# Patient Record
Sex: FEMALE
[De-identification: names, ages, dates, MRNs, and addresses within clinical notes are randomized per-mention and may not be internally consistent; named-entity substitution may affect disease eponyms.]

---

## 2019-01-16 ENCOUNTER — HOSPITAL ENCOUNTER (EMERGENCY)
Dept: HOSPITAL 14 - H.ER | Age: 34
Discharge: HOME | End: 2019-01-16
Payer: SELF-PAY

## 2019-01-16 VITALS
DIASTOLIC BLOOD PRESSURE: 58 MMHG | HEART RATE: 98 BPM | RESPIRATION RATE: 19 BRPM | SYSTOLIC BLOOD PRESSURE: 103 MMHG | TEMPERATURE: 98.5 F

## 2019-01-16 VITALS — OXYGEN SATURATION: 98 %

## 2019-01-16 DIAGNOSIS — J10.1: Primary | ICD-10-CM

## 2019-01-16 LAB
ALBUMIN SERPL-MCNC: 4.1 G/DL (ref 3.5–5)
ALBUMIN/GLOB SERPL: 1.3 {RATIO} (ref 1–2.1)
ALT SERPL-CCNC: 103 U/L (ref 9–52)
AST SERPL-CCNC: 88 U/L (ref 14–36)
BASE EXCESS BLDV CALC-SCNC: 1 MMOL/L (ref 0–2)
BASOPHILS # BLD AUTO: 0 K/UL (ref 0–0.2)
BASOPHILS NFR BLD: 0.3 % (ref 0–2)
BUN SERPL-MCNC: 4 MG/DL (ref 7–17)
CALCIUM SERPL-MCNC: 9.6 MG/DL (ref 8.4–10.2)
EOSINOPHIL # BLD AUTO: 0 K/UL (ref 0–0.7)
EOSINOPHIL NFR BLD: 0.3 % (ref 0–4)
ERYTHROCYTE [DISTWIDTH] IN BLOOD BY AUTOMATED COUNT: 13.4 % (ref 11.5–14.5)
GFR NON-AFRICAN AMERICAN: > 60
HGB BLD-MCNC: 12.5 G/DL (ref 12–16)
LYMPHOCYTES # BLD AUTO: 1 K/UL (ref 1–4.3)
LYMPHOCYTES NFR BLD AUTO: 16.1 % (ref 20–40)
MCH RBC QN AUTO: 30.7 PG (ref 27–31)
MCHC RBC AUTO-ENTMCNC: 34.1 G/DL (ref 33–37)
MCV RBC AUTO: 89.8 FL (ref 81–99)
MONOCYTES # BLD: 0.7 K/UL (ref 0–0.8)
MONOCYTES NFR BLD: 10.7 % (ref 0–10)
NEUTROPHILS # BLD: 4.7 K/UL (ref 1.8–7)
NEUTROPHILS NFR BLD AUTO: 72.6 % (ref 50–75)
NRBC BLD AUTO-RTO: 0.1 % (ref 0–0)
PCO2 BLDV: 42 MMHG (ref 40–60)
PH BLDV: 7.4 [PH] (ref 7.32–7.43)
PLATELET # BLD: 123 K/UL (ref 130–400)
PMV BLD AUTO: 10.5 FL (ref 7.2–11.7)
RBC # BLD AUTO: 4.07 MIL/UL (ref 3.8–5.2)
VENOUS BLOOD FIO2: 21 %
VENOUS BLOOD GAS PO2: 25 MM/HG (ref 30–55)
WBC # BLD AUTO: 6.5 K/UL (ref 4.8–10.8)

## 2019-01-16 PROCEDURE — 96360 HYDRATION IV INFUSION INIT: CPT

## 2019-01-16 PROCEDURE — 87804 INFLUENZA ASSAY W/OPTIC: CPT

## 2019-01-16 PROCEDURE — 82803 BLOOD GASES ANY COMBINATION: CPT

## 2019-01-16 PROCEDURE — 80053 COMPREHEN METABOLIC PANEL: CPT

## 2019-01-16 PROCEDURE — 99284 EMERGENCY DEPT VISIT MOD MDM: CPT

## 2019-01-16 PROCEDURE — 85025 COMPLETE CBC W/AUTO DIFF WBC: CPT

## 2019-01-16 NOTE — ED PDOC
HPI: Influenza


Time Seen by Provider: 01/16/19 19:19


Chief Complaint: Fever


Chief Complaint (Provider): Fever, body aches, and congestion


History Per: Patient


Exam Limitations: no limitations


Symptoms include: fever, bodyaches, nasal congestion


Additional complaint(s):: 





33 year old female, with no past medical history and is 13 weeks pregnant with 

no abnormalities in first trimester pranatal workup, presents to the ED with 1 

day of fever, bodyaches, and congestion.  Patient reports  has the same 

symptoms for 3 days.  She states she took a low dose of Tylenol but nothing 

else.  Patient also reports she felt like she was having palpitations prompting 

visit.





PMD: none provided





Past Medical History


Reviewed: Historical Data, Nursing Documentation, Vital Signs


Vital Signs: 





                                Last Vital Signs











Temp  100.2 F H  01/16/19 18:38


 


Pulse  127 H  01/16/19 18:38


 


Resp  18   01/16/19 18:38


 


BP  113/73   01/16/19 18:38


 


Pulse Ox  98   01/16/19 18:38














- Medical History


PMH: No Chronic Diseases





- Surgical History


Surgical History: No Surg Hx





- Family History


Family History: States: Unknown Family Hx





- Allergies


Allergies/Adverse Reactions: 


                                    Allergies











Allergy/AdvReac Type Severity Reaction Status Date / Time


 


No Known Allergies Allergy   Verified 01/16/19 19:26














Review of Systems


ROS Statement: Except As Marked, All Systems Reviewed And Found Negative


Constitutional: Positive for: Fever, Other (Bodyaches)


ENT: Positive for: Nose Congestion


Cardiovascular: Positive for: Palpitations





Physical Exam





- Reviewed


Nursing Documentation Reviewed: Yes


Vital Signs Reviewed: Yes





- Physical Exam


Appears: Positive for: Uncomfortable


Head Exam: Positive for: ATRAUMATIC, NORMOCEPHALIC


Skin: Positive for: Normal Color, Warm, Dry


Eye Exam: Positive for: Normal appearance


ENT: Positive for: Other (rhinorrhea)


Neck: Positive for: Normal, Painless ROM


Cardiovascular/Chest: Positive for: Regular Rate, Rhythm, Tachycardia


Respiratory: Positive for: Normal Breath Sounds.  Negative for: Wheezing, 

Respiratory Distress


Gastrointestinal/Abdominal: Positive for: Normal Exam, Soft.  Negative for: 

Tenderness


Extremity: Positive for: Normal ROM


Neurologic/Psych: Positive for: Alert, Oriented





Medical Decision Making


Medical Decision Making: 





Initial Impression: Work up for flu vs other viral illness





Initial Plan: 


--IV fluids


--Basic labs


--Flu swab


--Reassess patient





23:10


Pt with improved vitals and symptoms after 2 L of IV fluids and Tylenol.  

Influenza A positive.  Pt started on Tamiflu and given Rx for 5 days.  Pt's 

 present and discussed him registering as a patient to start Tamiflu and 

to take their kids to the pediatrician if needed for symptoms.  Pt already 

scheduled for PMD appointment next week.  Return parameters discussed.





---------------------------------------------

----------------------------------------------------


Scribe Attestation:


Documented by Eric Huang acting as a scribe for Tori Mccord MD.





Provider Scribe Attestation:


All medical record entries made by the Scribe were at my direction and 

personally dictated by me. I have reviewed the chart and agree that the record 

accurately reflects my personal performance of the history, physical exam, 

medical decision making, and the department course for this patient. I have also

personally directed, reviewed, and agree with the discharge instructions and 

disposition.





- Laboratory Results


Result Diagrams: 


                                 01/16/19 19:40





                                 01/16/19 19:40


Lab Results: 





                                        











Total Bilirubin  0.3 mg/dl (0.2-1.3)   01/16/19  19:40    


 


AST  88 U/L (14-36)  H  01/16/19  19:40    


 


ALT  103 U/L (9-52)  H  01/16/19  19:40    


 


Alkaline Phosphatase  89 U/L ()   01/16/19  19:40    


 


Total Protein  7.4 G/DL (6.3-8.2)   01/16/19  19:40    


 


Albumin  4.1 g/dL (3.5-5.0)   01/16/19  19:40    


 


Globulin  3.3 gm/dL (2.2-3.9)   01/16/19  19:40    


 


Albumin/Globulin Ratio  1.3  (1.0-2.1)   01/16/19  19:40    














- ECG


O2 Sat by Pulse Oximetry: 98





Disposition





- Clinical Impression


Clinical Impression: 


 Influenza A








- Disposition


Disposition: Routine/Home


Disposition Time: 23:10


Condition: IMPROVED


Forms:  [facility] (English)